# Patient Record
Sex: MALE | Race: WHITE
[De-identification: names, ages, dates, MRNs, and addresses within clinical notes are randomized per-mention and may not be internally consistent; named-entity substitution may affect disease eponyms.]

---

## 2019-11-17 ENCOUNTER — HOSPITAL ENCOUNTER (EMERGENCY)
Dept: HOSPITAL 63 - ER | Age: 39
Discharge: ANOTHER HOSPITAL | End: 2019-11-17
Payer: COMMERCIAL

## 2019-11-17 VITALS — SYSTOLIC BLOOD PRESSURE: 143 MMHG | DIASTOLIC BLOOD PRESSURE: 70 MMHG
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
## 2019-11-17 DIAGNOSIS — F17.210: ICD-10-CM
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
## 2019-11-17 DIAGNOSIS — S39.840A: Primary | ICD-10-CM
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
## 2019-11-17 LAB
ALBUMIN SERPL-MCNC: 3.7 G/DL (ref 3.4–5)
ALBUMIN/GLOB SERPL: 1.3 {RATIO} (ref 1–1.7)
ALP SERPL-CCNC: 90 U/L (ref 46–116)
ALT SERPL-CCNC: 31 U/L (ref 16–63)
ANION GAP SERPL CALC-SCNC: 9 MMOL/L (ref 6–14)
APTT PPP: (no result) S
AST SERPL-CCNC: 37 U/L (ref 15–37)
BACTERIA #/AREA URNS HPF: 0 /HPF
BASOPHILS # BLD AUTO: 0.1 X10^3/UL (ref 0–0.2)
BASOPHILS NFR BLD: 1 % (ref 0–3)
BILIRUB SERPL-MCNC: 0.5 MG/DL (ref 0.2–1)
BILIRUB UR QL STRIP: (no result)
BUN/CREAT SERPL: 11 (ref 6–20)
CA-I SERPL ISE-MCNC: 13 MG/DL (ref 8–26)
CALCIUM SERPL-MCNC: 8.4 MG/DL (ref 8.5–10.1)
CHLORIDE SERPL-SCNC: 107 MMOL/L (ref 98–107)
CO2 SERPL-SCNC: 27 MMOL/L (ref 21–32)
CREAT SERPL-MCNC: 1.2 MG/DL (ref 0.7–1.3)
EOSINOPHIL NFR BLD: 0 % (ref 0–3)
EOSINOPHIL NFR BLD: 0 X10^3/UL (ref 0–0.7)
ERYTHROCYTE [DISTWIDTH] IN BLOOD BY AUTOMATED COUNT: 14.1 % (ref 11.5–14.5)
FIBRINOGEN PPP-MCNC: CLEAR MG/DL
GFR SERPLBLD BASED ON 1.73 SQ M-ARVRAT: 67.1 ML/MIN
GLOBULIN SER-MCNC: 2.9 G/DL (ref 2.2–3.8)
GLUCOSE SERPL-MCNC: 107 MG/DL (ref 70–99)
GLUCOSE UR STRIP-MCNC: (no result) MG/DL
HCT VFR BLD CALC: 46.4 % (ref 39–53)
HGB BLD-MCNC: 15.6 G/DL (ref 13–17.5)
HYALINE CASTS #/AREA URNS LPF: (no result) /HPF
LYMPHOCYTES # BLD: 1.2 X10^3/UL (ref 1–4.8)
LYMPHOCYTES NFR BLD AUTO: 13 % (ref 24–48)
MCH RBC QN AUTO: 30 PG (ref 25–35)
MCHC RBC AUTO-ENTMCNC: 34 G/DL (ref 31–37)
MCV RBC AUTO: 89 FL (ref 79–100)
MONO #: 0.9 X10^3/UL (ref 0–1.1)
MONOCYTES NFR BLD: 9 % (ref 0–9)
NEUT #: 7.3 X10^3UL (ref 1.8–7.7)
NEUTROPHILS NFR BLD AUTO: 77 % (ref 31–73)
NITRITE UR QL STRIP: (no result)
PLATELET # BLD AUTO: 188 X10^3/UL (ref 140–400)
POTASSIUM SERPL-SCNC: 3.9 MMOL/L (ref 3.5–5.1)
PROT SERPL-MCNC: 6.6 G/DL (ref 6.4–8.2)
RBC # BLD AUTO: 5.21 X10^6/UL (ref 4.3–5.7)
RBC #/AREA URNS HPF: (no result) /HPF (ref 0–2)
SODIUM SERPL-SCNC: 143 MMOL/L (ref 136–145)
SP GR UR STRIP: >=1.03
SPERM #/AREA URNS HPF: PRESENT /HPF
SQUAMOUS #/AREA URNS LPF: (no result) /LPF
UROBILINOGEN UR-MCNC: 1 MG/DL
WBC # BLD AUTO: 9.4 X10^3/UL (ref 4–11)
WBC #/AREA URNS HPF: (no result) /HPF (ref 0–4)

## 2019-11-17 PROCEDURE — 99285 EMERGENCY DEPT VISIT HI MDM: CPT

## 2019-11-17 PROCEDURE — 81001 URINALYSIS AUTO W/SCOPE: CPT

## 2019-11-17 PROCEDURE — 76870 US EXAM SCROTUM: CPT

## 2019-11-17 PROCEDURE — 36415 COLL VENOUS BLD VENIPUNCTURE: CPT

## 2019-11-17 PROCEDURE — 85610 PROTHROMBIN TIME: CPT

## 2019-11-17 PROCEDURE — 85730 THROMBOPLASTIN TIME PARTIAL: CPT

## 2019-11-17 PROCEDURE — 80053 COMPREHEN METABOLIC PANEL: CPT

## 2019-11-17 PROCEDURE — 85025 COMPLETE CBC W/AUTO DIFF WBC: CPT

## 2019-11-17 NOTE — RAD
Testicular ultrasound 

 

History: Penile injury during sex, bruised scrotum, 2-3 hours ago. 

 

Comparison:  None.

 

Technique: Multiple grayscale, color flow Doppler and Doppler spectral 

analysis images of the scrotum are obtained. 

 

Findings:  

Right testicle measures 4.1 x 3.4 x 1.8 cm.  Right testicle demonstrates 

normal parenchymal echogenicity.  The right epididymis is unremarkable.

 

Left testicle measures 3.6 x 2.7 x 2.2 cm.  Left  testicle demonstrates 

normal parenchymal echogenicity.  The left epididymis is unremarkable.  

 

There is no hydrocele or varicocele. Inferior to the testicles there is 

moderate to severe scrotal thickening/edema measuring up to 2 cm in 

thickness. Superior to the testicles there is moderate scrotal 

thickening/edema.

 

Doppler imaging demonstrates normal flow to both testicles, without 

evidence of torsion.  

 

IMPRESSION:  

1.  The testicles are normal.

2.  Inferior and superior to the testicles there is scrotal 

thickening/edema. Some of the thickening may be due to complex fluid, 

given history may be hematoma.

 

Electronically signed by: Lucho Eid MD (11/17/2019 8:24 PM) [facility]

## 2019-11-17 NOTE — PHYS DOC
Past History


Past Medical History:  No Pertinent History


Past Surgical History:  Tonsillectomy


Smoking:  Cigarettes


Alcohol Use:  None


Drug Use:  None





Adult General


Chief Complaint


Chief Complaint:  GROIN PAIN





HPI


HPI





Patient is a 39-year-old male presents complaining of penile pain and bruising. 

Patient was having actual intercourse with his partner, and something shifted, 

he felt immediate pain and loss of erection on the right side. There was 

bruising that happened within 5 minutes. This happened approximately 1500 today.

At approximately 1700, he tried to complete the intercourse, was able to 

maintain erection, however bruising and swelling became worse. No improvement in

the discomfort. No previous trauma. Patient is not on any blood thinners. Last 

oral intake was approximately 1500. No previous abdominal or  surgery. Pain is

moderate to severe in intensity. No radiation. His last anti-inflammatory 

medicine was Aleve several days ago.[]





Review of Systems


Review of Systems





Constitutional: Denies fever or chills []


Eyes: Denies change in visual acuity, redness, or eye pain []


HENT: Denies nasal congestion or sore throat []


Respiratory: Denies cough or shortness of breath []


Cardiovascular: No chest pain or palpitations[]


GI: Denies abdominal pain, nausea, vomiting, bloody stools or diarrhea []


: Denies dysuria or hematuria, see history of present illness []


Musculoskeletal: Denies back pain or joint pain []


Integument: Denies rash or skin lesions []


Neurologic: Denies headache, focal weakness or sensory changes []


Endocrine: Denies polyuria or polydipsia []





All other systems were reviewed and found to be within normal limits, except as 

documented in this note.





Allergies


Allergies





Allergies








Coded Allergies Type Severity Reaction Last Updated Verified


 


  No Known Drug Allergies    11/17/19 No











Physical Exam


Physical Exam





Constitutional: Well developed, well nourished, I'll discomfort, non-toxic 

appearance. []


HENT: Normocephalic, atraumatic, bilateral external ears normal, oropharynx 

moist, no oral exudates, nose normal. []


Eyes: PERRLA, EOMI, conjunctiva normal, no discharge. [] 


Neck: Normal range of motion, no tenderness, supple, no stridor. [] 


Cardiovascular:Heart rate regular rhythm, no murmur []


Lungs & Thorax:  Bilateral breath sounds clear to auscultation []


Abdomen: Bowel sounds normal, soft, no tenderness, no masses, no pulsatile 

masses. 


 exam shows a normal male with bilateral descended testes. There is bruising 

on the right side of the penile shaft along with bruising, swelling, and 

tenderness in bilateral testes. Cremasteric reflex is intact bilaterally.[] 


Skin: Warm, dry, no erythema, no rash. [] 


Back: No tenderness, no CVA tenderness. [] 


Extremities: No tenderness, no cyanosis, no clubbing, ROM intact, no edema. [] 


Neurologic: Alert and oriented X 3, normal motor function, normal sensory 

function, no focal deficits noted. []


Psychologic: Affect normal, judgement normal, mood normal. []





Current Patient Data


Vital Signs





                                   Vital Signs








  Date Time  Temp Pulse Resp B/P (MAP) Pulse Ox O2 Delivery O2 Flow Rate FiO2


 


11/17/19 17:51  117 18  98 Room Air  








Lab Results





Laboratory Tests








Test


 11/17/19


18:22 11/17/19


19:10


 


White Blood Count 9.4 x10^3/uL  


 


Red Blood Count 5.21 x10^6/uL  


 


Hemoglobin 15.6 g/dL  


 


Hematocrit 46.4 %  


 


Mean Corpuscular Volume 89 fL  


 


Mean Corpuscular Hemoglobin 30 pg  


 


Mean Corpuscular Hemoglobin


Concent 34 g/dL 


 





 


Red Cell Distribution Width 14.1 %  


 


Platelet Count 188 x10^3/uL  


 


Neutrophils (%) (Auto) 77 %  


 


Lymphocytes (%) (Auto) 13 %  


 


Monocytes (%) (Auto) 9 %  


 


Eosinophils (%) (Auto) 0 %  


 


Basophils (%) (Auto) 1 %  


 


Neutrophils # (Auto) 7.3 x10^3uL  


 


Lymphocytes # (Auto) 1.2 x10^3/uL  


 


Monocytes # (Auto) 0.9 x10^3/uL  


 


Eosinophils # (Auto) 0.0 x10^3/uL  


 


Basophils # (Auto) 0.1 x10^3/uL  


 


Prothrombin Time 10.7 SEC  


 


Prothromb Time International


Ratio 1.0 


 





 


Activated Partial


Thromboplast Time 25 SEC 


 





 


Sodium Level 143 mmol/L  


 


Potassium Level 3.9 mmol/L  


 


Chloride Level 107 mmol/L  


 


Carbon Dioxide Level 27 mmol/L  


 


Anion Gap 9  


 


Blood Urea Nitrogen 13 mg/dL  


 


Creatinine 1.2 mg/dL  


 


Estimated GFR


(Cockcroft-Gault) 67.1 


 





 


BUN/Creatinine Ratio 11  


 


Glucose Level 107 mg/dL  


 


Calcium Level 8.4 mg/dL  


 


Total Bilirubin 0.5 mg/dL  


 


Aspartate Amino Transf


(AST/SGOT) 37 U/L 


 





 


Alanine Aminotransferase


(ALT/SGPT) 31 U/L 


 





 


Alkaline Phosphatase 90 U/L  


 


Total Protein 6.6 g/dL  


 


Albumin 3.7 g/dL  


 


Albumin/Globulin Ratio 1.3  


 


Urine Collection Type  Unknown 


 


Urine Color  Valeria 


 


Urine Clarity  Clear 


 


Urine pH  6.0 


 


Urine Specific Gravity  >=1.030 


 


Urine Protein  Neg 


 


Urine Glucose (UA)  Neg mg/dL 


 


Urine Ketones (Stick)  40 mg/dL 


 


Urine Blood  Neg 


 


Urine Nitrite  Neg 


 


Urine Bilirubin  Neg 


 


Urine Urobilinogen Dipstick  1 mg/dL 


 


Urine Leukocyte Esterase  Neg 


 


Urine RBC  Occ /HPF 


 


Urine WBC  Rare /HPF 


 


Urine Squamous Epithelial


Cells 


 Occ /LPF 





 


Urine Bacteria  0 /HPF 


 


Urine Hyaline Casts  Mod /HPF 


 


Urine Mucus  Mod /LPF 


 


Urine Sperm  Present /HPF 








Current Medications








 Medications


  (Trade)  Dose


 Ordered  Sig/Chanelle


 Route


 PRN Reason  Start Time


 Stop Time Status Last Admin


Dose Admin


 


 Morphine Sulfate


  (Morphine 4mg


 Syringe)  4 mg  1X  ONCE


 IV


   11/17/19 18:15


 11/17/19 18:16 DC  














EKG


EKG


[]





Radiology/Procedures


Radiology/Procedures


[]





Course & Med Decision Making


Course & Med Decision Making


Pertinent Labs and Imaging studies reviewed. (See chart for details)





ED course: Patient arrived, was placed in bed, and tolerated exam well. Anti-

inflammatory medication was held due to concern for possible need for surgery. 

Patient was ordered for narcotic pain medicine but deferred because he is in a 

recovery type program. Consultation was made with urology at , Dr. Roque,

 who recommended surgery. At the time of this dictation still waiting 

ultrasound. He was transferred in improved condition.





Medical decision-making: Believe this patient to have a penile fracture. He is 

being transferred for higher level of urologic care.[]





Dragon Disclaimer


Dragon Disclaimer


This electronic medical record was generated, in whole or in part, using a voice

 recognition dictation system.





Departure


Departure:


Impression:  


   Primary Impression:  


   Penile fracture


Disposition:  05 TRANSFER OTHER


Condition:  IMPROVED


Referrals:  


PCP,NO (PCP)





Problem Qualifiers








   Primary Impression:  


   Penile fracture


   Encounter type:  initial encounter  Qualified Codes:  S39.840A - Fracture of 

   corpus cavernosum penis, initial encounter








AMIRA MONTIEL DO          Nov 17, 2019 18:17

## 2021-03-09 ENCOUNTER — HOSPITAL ENCOUNTER (EMERGENCY)
Dept: HOSPITAL 63 - ER | Age: 41
Discharge: HOME | End: 2021-03-09
Payer: COMMERCIAL

## 2021-03-09 VITALS — DIASTOLIC BLOOD PRESSURE: 99 MMHG | SYSTOLIC BLOOD PRESSURE: 153 MMHG

## 2021-03-09 VITALS — BODY MASS INDEX: 31.74 KG/M2 | HEIGHT: 69 IN | WEIGHT: 214.29 LBS
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
## 2021-03-09 DIAGNOSIS — S41.131A: Primary | ICD-10-CM

## 2021-03-09 DIAGNOSIS — F17.210: ICD-10-CM
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
## 2021-03-09 PROCEDURE — 90471 IMMUNIZATION ADMIN: CPT

## 2021-03-09 PROCEDURE — 99284 EMERGENCY DEPT VISIT MOD MDM: CPT

## 2021-03-09 PROCEDURE — 90714 TD VACC NO PRESV 7 YRS+ IM: CPT

## 2021-03-09 PROCEDURE — 73060 X-RAY EXAM OF HUMERUS: CPT

## 2021-03-09 PROCEDURE — 96372 THER/PROPH/DIAG INJ SC/IM: CPT

## 2021-03-09 RX ADMIN — CEFTRIAXONE ONE GM: 1 INJECTION, POWDER, FOR SOLUTION INTRAMUSCULAR; INTRAVENOUS at 21:17

## 2021-03-09 RX ADMIN — KETOROLAC TROMETHAMINE ONE MG: 30 INJECTION, SOLUTION INTRAMUSCULAR at 21:17

## 2021-03-09 RX ADMIN — MORPHINE SULFATE ONE MG: 10 INJECTION, SOLUTION INTRAMUSCULAR; INTRAVENOUS at 21:18

## 2021-03-09 RX ADMIN — CLOSTRIDIUM TETANI TOXOID ANTIGEN (FORMALDEHYDE INACTIVATED) AND CORYNEBACTERIUM DIPHTHERIAE TOXOID ANTIGEN (FORMALDEHYDE INACTIVATED) ONE ML: 5; 2 INJECTION, SUSPENSION INTRAMUSCULAR at 21:27

## 2021-03-09 NOTE — RAD
Right humerus 2 views.



HISTORY: Dog bite



2 views of the right humerus show air in the soft tissues from soft tissue injury. There is no fractu
re or acute osseous abnormality.



IMPRESSION:

1. Soft tissue air from injury.

2. No fracture or osseous abnormality.



Electronically signed by: Breezy Tinoco MD (3/9/2021 9:30 PM) [id number]

## 2021-03-09 NOTE — PHYS DOC
Past History


Past Medical History:  No Pertinent History


Past Surgical History:  Tonsillectomy


Smoking:  Cigarettes


Alcohol Use:  None


Drug Use:  None





General Adult


EDM:


Chief Complaint:  ANIMAL BITE





HPI:


HPI:


"...My dog Blue .. he's a pit bull.. but we where moving.. and had boxes every 

where.. he just gotten some pussey.. and with our female dog.. may he thought...

he  was blocking him from the female.. so attacked me.. .. got a hold on this 

Rt. arm.. and would n't let go...."





Patient is a 40 year old male who presents with above hx and complaints of being

bit by his own dog " Blue".  Patient's dog as a pit bull breed.  For the dog has

bitten people before.  Patient states dog is currently up-to-date with 

vaccinations.  Patient is due for vaccinations of tetanus.  Patient has multiple

puncture wounds and ecchymosis to right upper arm.  Does have distal sensation. 

Does have distal cap refill equal to his left hand.  Patient denies any history 

immunosuppression.  No recent travel.  No severe ill contacts.  Patient is 

right-handed dominant.  Patient is right-hand dominant.





Review of Systems:


Review of Systems:


Constitutional:  Denies fever or chills 


Eyes:  Denies change in visual acuity 


HENT:  Denies nasal congestion or sore throat 


Respiratory:  Denies cough or shortness of breath 


Cardiovascular:  Denies chest pain or edema 


GI:  Denies abdominal pain, nausea, vomiting, bloody stools or diarrhea 


: Denies dysuria 


Musculoskeletal: Complains of dog bite right upper arm


Integument:  Denies rash 


Neurologic:  Denies headache, focal weakness or sensory changes 


Endocrine:  Denies polyuria or polydipsia 


Lymphatic:  Denies swollen glands 


Psychiatric:  Denies depression or anxiety





Family History:


Family History:


Noncontributory





Current Medications:


Current Meds:


See nursing for home meds





Allergies:


Allergies:





Allergies








Coded Allergies Type Severity Reaction Last Updated Verified


 


  No Known Drug Allergies    19 No











Physical Exam:


PE:





Constitutional: Well developed, well nourished, no acute distress, non-toxic 

appearance. []


HENT: Normocephalic, atraumatic, bilateral external ears normal, oropharynx 

moist, no oral exudates, nose normal. []


Eyes: PERRLA, EOMI, conjunctiva normal, no discharge. [] 


Neck: Normal range of motion, no tenderness, supple, no stridor. [] 


Cardiovascular:Heart rate regular rhythm, no murmur []


Lungs & Thorax:  Bilateral breath sounds clear to auscultation []


Abdomen: Bowel sounds normal, soft, no tenderness, no masses, no pulsatile 

masses. [] 


Skin: Warm, dry, no erythema, no rash.  Multiple bite puncture wounds right 

upper arm.  Multiple tattoos.


Back: No tenderness, no CVA tenderness. [] 


Extremities: No tenderness, no cyanosis, no clubbing, ROM intact, no edema. [] 

Except findings in right upper arm at site of dog bite.  Distal neurovascular is

 equal in right hand as compared to left hand.  Capillary refill equal to the 

right hand as compared to left hand.


Neurologic: Alert and oriented X 3, normal motor function, normal sensory 

function, no focal deficits noted. []


Psychologic: Affect normal, judgement normal, mood normal. []





EKG:


EKG:


[]





Radiology/Procedures:


Radiology/Procedures:


[][facility] [address], [place] [address]


                                 [phone]


                                        


                                 IMAGING REPORT





                                     Signed





PATIENT: HILARIO CARSON  ACCOUNT: [id number]     MRN#: [id number]


: 1980           LOCATION: ER              AGE: 40


SEX: M                    EXAM DT: 21         ACCESSION#: [id number]


STATUS: REG ER            ORD. PHYSICIAN: MESFIN MARTINEZ MD


REASON: dog bite


PROCEDURE: HUMERUS RIGHT





Right humerus 2 views.





HISTORY: Dog bite





2 views of the right humerus show air in the soft tissues from soft tissue 

injury. There is no fracture or acute osseous abnormality.





IMPRESSION:


1. Soft tissue air from injury.


2. No fracture or osseous abnormality.





Electronically signed by: Breezy Tinoco MD (3/9/2021 9:30 PM) [id number]














DICTATED AND SIGNED BY:     BREEZY TINOCO MD


DATE:     21





CC: MESFIN MARTINEZ MD; KETAN ROMO DO ~MTH0 0





Heart Score:


C/O Chest Pain:  N/A


Risk Factors:


Risk Factors:  DM, Current or recent (<one month) smoker, HTN, HLP, family 

history of CAD, obesity.


Risk Scores:


Score 0 - 3:  2.5% MACE over next 6 weeks - Discharge Home


Score 4 - 6:  20.3% MACE over next 6 weeks - Admit for Clinical Observation


Score 7 - 10:  72.7% MACE over next 6 weeks - Early Invasive Strategies





Course & Med Decision Making:


Course & Med Decision Making


Pertinent Labs and Imaging studies reviewed. (See chart for details)





Patient's bite wounds were cleaned and dressed.  Patient received a gram of 

Rocephin IM.  Toradol 60 and morphine 10 mg subcu.


Do not kill dog, at this time.  Must be observed for the next 10 days for signs 

of rabies.   Take Tylenol and ibuprofen for pain.  May take Vicoprofen up to 4 

times a day for marked pain.  Take Augmentin 875 twice a day.  Soak arm with war

m compresses of salt water or Epson salts x 4 times a day. Then massage wounds 

with Polysporin 4 times a day.  Monitor closely for signs of infection.  Follow-

up with primary care.  Return if any concerns.








Impression:





1. Dog bite Rt., Upper arm





[]





Dragon Disclaimer:


Dragon Disclaimer:


This electronic medical record was generated, in whole or in part, using a voice

 recognition dictation system.





Departure


Departure:


Referrals:  


KETAN ROMO DO (PCP)


Scripts


Hydrocodone/Ibuprofen (HYDROCODONE-IBUPROFEN 7.5-200  **) 1 Each Tablet


1 TAB PO PRN Q6HRS PRN for PAIN, #30 TAB 0 Refills


   Prov: MESFIN MARTINEZ MD         3/9/21 


Amoxicillin/Potassium Clav (AUGMENTIN 875-125 TABLET) 1 Each Tablet


1 TAB PO BID for DOG BITE for 10 Days, #20 TAB 0 Refills


   Prov: MESFIN MARTINEZ MD         3/9/21





Dragon Disclaimer


This chart was dictated in whole or in part using Voice Recognition software in 

a busy, high-work load, and often noisy Emergency Department environment.  It 

may contain unintended and wholly unrecognized errors or omissions.











MESFIN MARTINEZ MD            Mar 9, 2021 21:01